# Patient Record
Sex: FEMALE | Race: WHITE
[De-identification: names, ages, dates, MRNs, and addresses within clinical notes are randomized per-mention and may not be internally consistent; named-entity substitution may affect disease eponyms.]

---

## 2019-09-16 ENCOUNTER — HOSPITAL ENCOUNTER (EMERGENCY)
Dept: HOSPITAL 52 - LL.ED | Age: 34
Discharge: NURSING, REHAB OR LONG-TERM CARE | End: 2019-09-16
Payer: COMMERCIAL

## 2019-09-16 DIAGNOSIS — Z88.2: ICD-10-CM

## 2019-09-16 DIAGNOSIS — Z88.5: ICD-10-CM

## 2019-09-16 DIAGNOSIS — K35.80: Primary | ICD-10-CM

## 2019-09-16 LAB
CHLORIDE SERPL-SCNC: 105 MMOL/L (ref 98–107)
SODIUM SERPL-SCNC: 143 MMOL/L (ref 136–145)

## 2019-09-16 NOTE — EDM.PDOC
ED HPI GENERAL MEDICAL PROBLEM





- General


Chief Complaint: Fever


Stated Complaint: Abdominal pain, Fever


Time Seen by Provider: 09/16/19 16:00


Source of Information: Reports: Patient, Family


History Limitations: Reports: No Limitations





- History of Present Illness


INITIAL COMMENTS - FREE TEXT/NARRATIVE: 





Patient brought to ER from clinic to be evaluated for complaint of abdominal 

pain and fever. Symptoms began yesterday.  Pain is described as being central, 

around umbilicus.  


Felt feverish today.  Arrived with severe shaking chills.  Staff initially 

unable to get accurate BP/pulse/O2 sat readings.  


Patient reports that she has had approximately 3 other episodes of severe 

abdominal pain in the same area over the past year or so. Most recent episode 

was May.  Pain is in same location/severe.  Resolves on own. 


Pain at its worst was also affected by breathing...deep breath caused pain to 

shoot upwards towards chest.  


Patient denies other changes, such as SOB/hematuria/back pain/UTI complaints.  

Normal bowel movements.  No emesis/nausea. 


Is nursing her youngest baby still, reports still has not had a menstrual cycle 

since delivery. 


Last meal was around noon today. 


No chronic medical problems other than reported hernia below umbilicus.





- Related Data


 Allergies











Allergy/AdvReac Type Severity Reaction Status Date / Time


 


hydrocodone Allergy  Nausea and Verified 09/16/19 15:55





   Vomiting  


 


Sulfa (Sulfonamide Allergy  Cannot Verified 09/16/19 15:55





Antibiotics)   Remember  











Home Meds: 


 Home Meds





. [No Known Home Meds]  09/16/19 [History]











Past Medical History


Other Gastrointestinal History: Hernia below umbilicus.





ED ROS GENERAL





- Review of Systems


Review Of Systems: See Below


Constitutional: Reports: Fever, Chills, Malaise, Weakness, Fatigue.  Denies: 

Night Sweats, Diaphoresis, Decreased Appetite, Weight Loss, Weight Gain


HEENT: Reports: No Symptoms


Respiratory: Reports: No Symptoms.  Denies: Shortness of Breath, Pleuritic 

Chest Pain, Cough, Sputum, Hemoptysis


Cardiovascular: Reports: No Symptoms.  Denies: Chest Pain, Edema, 

Lightheadedness, Palpitations


GI/Abdominal: Reports: Abdominal Pain.  Denies: Black Stool, Bloody Stool, 

Difficulty Swallowing, Distension, Hematemesis, Nausea, Vomiting


: Reports: No Symptoms


Musculoskeletal: Reports: Back Pain


Skin: Reports: Pallor


Neurological: Reports: No Symptoms


Psychiatric: Reports: No Symptoms


Hematologic/Lymphatic: Reports: No Symptoms





ED EXAM, GENERAL





- Physical Exam


Exam: See Below


Exam Limited By: No Limitations


General Appearance: Moderate Distress (shaking chills, uncomfortable)


Eye Exam: Bilateral Eye: EOMI, PERRL


Ears: Normal External Exam


Nose: No: Nasal Deformity, Nasal Swelling, Nasal Drainage


Throat/Mouth: Normal Lips, Normal Voice, No Airway Compromise


Head: Atraumatic, Normocephalic


Neck: Normal Inspection, Supple, Non-Tender, Full Range of Motion


Respiratory/Chest: No Respiratory Distress, Lungs Clear, Normal Breath Sounds, 

No Accessory Muscle Use, Chest Non-Tender


Cardiovascular: No Edema, No Murmur, Tachycardia


Peripheral Pulses: 2+: Radial (L), Radial (R)


GI/Abdominal: Soft, Other (Not able to palpate obvious hernia.  Some discomfort 

with palpation of abdomen around/just below umbilicus.  Decreased bowel sounds)

.  No: Guarding, Rigid, Rebound


 (Female) Exam: Deferred


Rectal (Female) Exam: Deferred


Back Exam: No: CVA Tenderness (L), CVA Tenderness (R), Muscle Spasm, Paraspinal 

Tenderness, Vertebral Tenderness


Extremities: Normal Range of Motion, Non-Tender, Slow Capillary Refill


Neurological: Alert, Oriented, Normal Cognition, No Motor/Sensory Deficits


Psychiatric: Normal Affect, Normal Mood


Skin Exam: Warm, Dry, Intact, Other (Initially patient appeared a bit mottled/

dusky when first came to ER with generalized shaking chills.  Color improved 

once chills resoved. )





Course





- Vital Signs


Last Recorded V/S: 


 Last Vital Signs











Temp  39.3 C H  09/16/19 15:56


 


Pulse  131 H  09/16/19 15:56


 


Resp  15   09/16/19 15:56


 


BP  120/77   09/16/19 15:56


 


Pulse Ox  86 L  09/16/19 15:56














- Orders/Labs/Meds


Orders: 


 Active Orders 24 hr











 Category Date Time Status


 


 NPO [Nothing Per Oral Diet] [DIET] Diet  09/17/19 Breakfast Ordered


 


 Abdomen Pelvis w Cont [CT] Stat Exams  09/16/19 16:35 Taken


 


 CULTURE BLOOD [BC] Stat Lab  09/16/19 15:49 Received


 


 CULTURE BLOOD [BC] Stat Lab  09/16/19 16:00 Received


 


 Sodium Chloride 0.9% [Normal Saline] 1,000 ml Med  09/16/19 17:00 Active





 IV ASDIRECTED   


 


 Sodium Chloride 0.9% [Saline Flush] Med  09/16/19 16:00 Active





 10 ml FLUSH ASDIRECTED PRN   


 


 Blood Culture x2 Reflex Set [OM.PC] Stat Oth  09/16/19 16:03 Ordered


 


 Saline Lock Insert [OM.PC] Stat Oth  09/16/19 16:00 Ordered








 Medication Orders





Sodium Chloride (Normal Saline)  1,000 mls @ 200 mls/hr IV ASDIRECTED RENATA


   Last Admin: 09/16/19 16:58  Dose: 200 mls/hr


Sodium Chloride (Saline Flush)  10 ml FLUSH ASDIRECTED PRN


   PRN Reason: Keep Vein Open








Labs: 


 Laboratory Tests











  09/16/19 09/16/19 09/16/19 Range/Units





  16:00 16:00 16:00 


 


WBC   5.9   (4.0-10.2)  K/uL


 


RBC   4.37   (3.77-5.09)  M/uL


 


Hgb   13.3   (11.7-15.5)  g/dL


 


Hct   40.7   (34.0-46.0)  %


 


MCV   93.1   (84.0-98.0)  fL


 


MCH   30.4   (28.2-33.3)  pg


 


MCHC   32.7   (31.7-36.0)  g/dL


 


RDW   14.9 H   (11.2-14.1)  %


 


Plt Count   254   (150-350)  K/uL


 


Neut % (Auto)   88.2 H   (45.0-80.0)  %


 


Lymph % (Auto)   10.4   (10.0-50.0)  %


 


Mono % (Auto)   0.3 L   (2.0-14.0)  %


 


Eos % (Auto)   0.9   (0.0-5.0)  %


 


Baso % (Auto)   0.2   (0.0-2.0)  %


 


Neut # (Auto)   5.17   (1.40-7.00)  K/uL


 


Lymph # (Auto)   0.61   (0.50-3.50)  K/uL


 


Mono # (Auto)   0.02   (0.00-1.00)  K/uL


 


Eos # (Auto)   0.05   (0.00-0.50)  K/uL


 


Baso # (Auto)   0.01   (0.00-0.20)  K/uL


 


Sodium    143  (136-145)  mmol/L


 


Potassium    3.7  (3.5-5.1)  mmol/L


 


Chloride    105  ()  mmol/L


 


Carbon Dioxide    23.7  (21.0-32.0)  mmol/L


 


BUN    12  (7-18)  mg/dL


 


Creatinine    0.74  (0.51-1.17)  mg/dL


 


Est Cr Clr Drug Dosing    TNP  


 


Estimated GFR (MDRD)    > 60  mL/min


 


Glucose    99  ()  mg/dL


 


Lactic Acid     (0.4-2.0)  mmol/L


 


Calcium    8.7  (8.5-10.1)  mg/dL


 


Magnesium    1.9  (1.8-2.4)  mg/dL


 


Total Bilirubin    0.6  (0.2-1.0)  mg/dL


 


AST    8 L  (15-37)  U/L


 


ALT    13  (12-78)  U/L


 


Alkaline Phosphatase    93  ()  IU/L


 


Total Protein    7.5  (6.4-8.2)  g/dL


 


Albumin    3.5  (3.4-5.0)  g/dL


 


HCG, Qual     (NEGATIVE)  


 


Specimen Type  Urinblad    


 


Urine Color  Yellow    


 


Urine Appearance  Clear    


 


Urine pH  5.5    (5.0-9.0)  


 


Ur Specific Gravity  1.015    (1.005-1.030)  


 


Urine Protein  30 H    (NEGATIVE)  mg/dL


 


Urine Glucose (UA)  Negative    (NEGATIVE)  mg/dL


 


Urine Ketones  40 H    (NEGATIVE)  mg/dL


 


Urine Occult Blood  Negative    (NEGATIVE)  


 


Urine Nitrite  Negative    (NEGATIVE)  


 


Urine Bilirubin  Negative    (NEGATIVE)  


 


Urine Urobilinogen  0.2    (0.2-1.0)  E.U./dL


 


Ur Leukocyte Esterase  Negative    (NEGATIVE)  


 


Urine RBC  Not seen    /HPF


 


Urine WBC  5-10 H    /HPF


 


Ur Epithelial Cells  Moderate H    /LPF


 


Urine Bacteria  Few    (NONE TO FEW)  /HPF














  09/16/19 09/16/19 Range/Units





  16:00 16:00 


 


WBC    (4.0-10.2)  K/uL


 


RBC    (3.77-5.09)  M/uL


 


Hgb    (11.7-15.5)  g/dL


 


Hct    (34.0-46.0)  %


 


MCV    (84.0-98.0)  fL


 


MCH    (28.2-33.3)  pg


 


MCHC    (31.7-36.0)  g/dL


 


RDW    (11.2-14.1)  %


 


Plt Count    (150-350)  K/uL


 


Neut % (Auto)    (45.0-80.0)  %


 


Lymph % (Auto)    (10.0-50.0)  %


 


Mono % (Auto)    (2.0-14.0)  %


 


Eos % (Auto)    (0.0-5.0)  %


 


Baso % (Auto)    (0.0-2.0)  %


 


Neut # (Auto)    (1.40-7.00)  K/uL


 


Lymph # (Auto)    (0.50-3.50)  K/uL


 


Mono # (Auto)    (0.00-1.00)  K/uL


 


Eos # (Auto)    (0.00-0.50)  K/uL


 


Baso # (Auto)    (0.00-0.20)  K/uL


 


Sodium    (136-145)  mmol/L


 


Potassium    (3.5-5.1)  mmol/L


 


Chloride    ()  mmol/L


 


Carbon Dioxide    (21.0-32.0)  mmol/L


 


BUN    (7-18)  mg/dL


 


Creatinine    (0.51-1.17)  mg/dL


 


Est Cr Clr Drug Dosing    


 


Estimated GFR (MDRD)    mL/min


 


Glucose    ()  mg/dL


 


Lactic Acid  3.0 H   (0.4-2.0)  mmol/L


 


Calcium    (8.5-10.1)  mg/dL


 


Magnesium    (1.8-2.4)  mg/dL


 


Total Bilirubin    (0.2-1.0)  mg/dL


 


AST    (15-37)  U/L


 


ALT    (12-78)  U/L


 


Alkaline Phosphatase    ()  IU/L


 


Total Protein    (6.4-8.2)  g/dL


 


Albumin    (3.4-5.0)  g/dL


 


HCG, Qual   Negative  (NEGATIVE)  


 


Specimen Type    


 


Urine Color    


 


Urine Appearance    


 


Urine pH    (5.0-9.0)  


 


Ur Specific Gravity    (1.005-1.030)  


 


Urine Protein    (NEGATIVE)  mg/dL


 


Urine Glucose (UA)    (NEGATIVE)  mg/dL


 


Urine Ketones    (NEGATIVE)  mg/dL


 


Urine Occult Blood    (NEGATIVE)  


 


Urine Nitrite    (NEGATIVE)  


 


Urine Bilirubin    (NEGATIVE)  


 


Urine Urobilinogen    (0.2-1.0)  E.U./dL


 


Ur Leukocyte Esterase    (NEGATIVE)  


 


Urine RBC    /HPF


 


Urine WBC    /HPF


 


Ur Epithelial Cells    /LPF


 


Urine Bacteria    (NONE TO FEW)  /HPF











Meds: 


Medications











Generic Name Dose Route Start Last Admin





  Trade Name Melinda  PRN Reason Stop Dose Admin


 


Sodium Chloride  1,000 mls @ 200 mls/hr  09/16/19 17:00  09/16/19 16:58





  Normal Saline  IV   200 mls/hr





  ASDIRECTED RENATA   Administration





     





     





     





     


 


Sodium Chloride  10 ml  09/16/19 16:00  





  Saline Flush  FLUSH   





  ASDIRECTED PRN   





  Keep Vein Open   





     





     





     














Discontinued Medications














Generic Name Dose Route Start Last Admin





  Trade Name Freq  PRN Reason Stop Dose Admin


 


Acetaminophen  650 mg  09/16/19 16:24  09/16/19 16:30





  Tylenol  PO  09/16/19 16:25  650 mg





  NOW ONE   Administration





     





     





     





     


 


Diatrizoate Meglum/Diatrizoate Sod  30 ml  09/16/19 16:38  





  Gastrografin 37%  PO  09/16/19 16:39  





  ONETIME ONE   





     





     





     





     


 


Ertapenem  1 gm  09/16/19 19:06  





  Invanz  IVPUSH  09/16/19 19:07  





  ONETIME ONE   





     





     





     





     


 


Fentanyl  100 mcg  09/16/19 16:02  





  Sublimaze  IVPUSH  09/16/19 16:03  





  ONETIME ONE   





     





     





     





     


 


Sodium Chloride  1,000 mls @ 999 mls/hr  09/16/19 16:01  09/16/19 15:55





  Normal Saline  IV  09/16/19 17:01  999 mls/hr





  .BOLUS ONE   Administration





     





     





     





     


 


Iopamidol  100 ml  09/16/19 16:38  09/16/19 17:52





  Isovue-300 (61%)  IVPUSH  09/16/19 16:39  100 ml





  ONETIME ONE   Administration





     





     





     





     


 


Ondansetron HCl  4 mg  09/16/19 16:00  





  Zofran  IVPUSH  09/16/19 16:01  





  ONETIME ONE   





     





     





     





     














- Radiology Interpretation


CT Results Date: 09/16/19


CT Results Time: 18:35 (+ Appendicitis)





- Re-Assessments/Exams


Free Text/Narrative Re-Assessment/Exam: 








Patient given IV fluids.  Abdominal pain improved, shaking chills resolved. 

Patient appeared much more comfortable. Refused pain meds. Able to obtain vital 

signs. Tachy.  Improved O2 Sats noted. Given Tylenol for fever.  Labs 

requested.  Blood cultures drawn.  WBC normal.  Lactic acid 3.  Chemistry 

unremarkable.  UA showed ketones, but no evidence of UTI.  CT scan of abdomen 

and pelvis ordered.  Scan ultimately positive for identifying an acute 

appendicitis. Call placed to [facility] at 1900.  Patient accepted for transfer 

by .  He requested single dose Invanz before patient left ER.  This 

was given. She refused EMS transport.  Will be driven by her  to 

[facility] ER by private vehicle. 





Departure





- Departure


Time of Disposition: 19:30


Disposition: DC/Tfer to [facility] 02


Condition: Good


Clinical Impression: 


 History of abdominal hernia





Appendicitis


Qualifiers:


 Appendicitis type: acute appendicitis Acute appendicitis type: unspecified 

acute appendicitis type Qualified Code(s): K35.80 - Unspecified acute 

appendicitis








- Discharge Information


*PRESCRIPTION DRUG MONITORING PROGRAM REVIEWED*: Not Applicable


*COPY OF PRESCRIPTION DRUG MONITORING REPORT IN PATIENT TAVO: Not Applicable


Referrals: 


Britni Jim, NP [Primary Care Provider] - 


Forms:  ED Department Discharge, ED Department Discharge


Additional Instructions: 


Drive directly to [facility] in [place].  Do not eat/drink on way. 





- My Orders


Last 24 Hours: 


My Active Orders





09/16/19 15:49


CULTURE BLOOD [BC] Stat 





09/16/19 16:00


CULTURE BLOOD [BC] Stat 


Sodium Chloride 0.9% [Saline Flush]   10 ml FLUSH ASDIRECTED PRN 


Saline Lock Insert [OM.PC] Stat 





09/16/19 16:03


Blood Culture x2 Reflex Set [OM.PC] Stat 





09/16/19 16:35


Abdomen Pelvis w Cont [CT] Stat 





09/16/19 17:00


Sodium Chloride 0.9% [Normal Saline] 1,000 ml IV ASDIRECTED 





09/17/19 Breakfast


NPO [Nothing Per Oral Diet] [DIET] 














- Assessment/Plan


Last 24 Hours: 


My Active Orders





09/16/19 15:49


CULTURE BLOOD [BC] Stat 





09/16/19 16:00


CULTURE BLOOD [BC] Stat 


Sodium Chloride 0.9% [Saline Flush]   10 ml FLUSH ASDIRECTED PRN 


Saline Lock Insert [OM.PC] Stat 





09/16/19 16:03


Blood Culture x2 Reflex Set [OM.PC] Stat 





09/16/19 16:35


Abdomen Pelvis w Cont [CT] Stat 





09/16/19 17:00


Sodium Chloride 0.9% [Normal Saline] 1,000 ml IV ASDIRECTED 





09/17/19 Breakfast


NPO [Nothing Per Oral Diet] [DIET]